# Patient Record
Sex: MALE | Race: BLACK OR AFRICAN AMERICAN | Employment: OTHER | URBAN - METROPOLITAN AREA
[De-identification: names, ages, dates, MRNs, and addresses within clinical notes are randomized per-mention and may not be internally consistent; named-entity substitution may affect disease eponyms.]

---

## 2024-08-19 ENCOUNTER — PROCEDURE VISIT (OUTPATIENT)
Age: 49
End: 2024-08-19
Payer: COMMERCIAL

## 2024-08-19 DIAGNOSIS — R20.2 NUMBNESS AND TINGLING IN BOTH HANDS: ICD-10-CM

## 2024-08-19 DIAGNOSIS — G56.03 CARPAL TUNNEL SYNDROME, BILATERAL: Primary | ICD-10-CM

## 2024-08-19 DIAGNOSIS — M25.531 PAIN IN BOTH WRISTS: ICD-10-CM

## 2024-08-19 DIAGNOSIS — R20.0 NUMBNESS AND TINGLING IN BOTH HANDS: ICD-10-CM

## 2024-08-19 DIAGNOSIS — M25.532 PAIN IN BOTH WRISTS: ICD-10-CM

## 2024-08-19 PROCEDURE — 95886 MUSC TEST DONE W/N TEST COMP: CPT | Performed by: PSYCHIATRY & NEUROLOGY

## 2024-08-19 PROCEDURE — 95913 NRV CNDJ TEST 13/> STUDIES: CPT | Performed by: PSYCHIATRY & NEUROLOGY

## 2024-08-19 NOTE — PROGRESS NOTES
ELECTRODIANOSTIC REPORT  Test Date:  2024    Patient: Karo Gomez : 1975 Physician: Dr. Abraham   ID#:  [id number]: Male Ref. Phys: Dr. Sarah     Patient History / Exam:    The patient is a pleasant 49-year-old male who presents with sensory disturbance, numbness in his bilateral upper extremities.  Strength is 5 out of 5.  He does have sensory loss predominantly in the median distribution    EMG & NCV Findings:    Nerve conduction studies as listed below were normal for the bilateral radial sensory and ulnar sensory.  There is no significant side-to-side difference.  The right median sensory revealed prolongation of the peak latency with slightly reduced amplitude.  The left median sensory revealed a mild prolongation of peak latency with a normal amplitude    The bilateral ulnar motor nerve conduction studies were normal.  The bilateral median motor studies revealed prolongation of distal motor latency with normal amplitude and conduction velocity.    The bilateral median mixed nerve studies were prolonged    Disposable concentric needle examination of the muscles listed below in the bilateral upper extremities was normal.    Impression:    This study was abnormal.  There is electrodiagnostic evidence upon today's examination suggestin.  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  The degree of entrapment was worse on the right.    2.  There is no evidence of other focal neuropathy, a generalized large fiber neuropathy, myopathy or cervical radiculopathy.    ___________________________  Ramsey Abraham D.O. FAAN    Nerve Conduction Studies  Anti Sensory Summary Table     Stim Site NR Onset (ms) Peak (ms) O-P Amp (µV) Norm Peak (ms) Norm O-P Amp Site1 Site2 Dist (cm) Norm Chandler (m/s)   Left Median Anti Sensory (2nd Digit)   Wrist    2.8 4.1 22.2 <4 >11 Wrist 2nd Digit 14.0    Right Median Anti Sensory